# Patient Record
(demographics unavailable — no encounter records)

---

## 2024-11-17 NOTE — DISCUSSION/SUMMARY
[de-identified] : We discussed all these findings with the patient.  X-rays were again reviewed and discussed with the patient.  We discussed right knee DJD and lower back pain.  In regard to his left hip, he is doing very well.  Patient will continue exercises with appropriate modifications and will follow-up in 3 months.

## 2024-11-17 NOTE — DISCUSSION/SUMMARY
[de-identified] : We discussed all these findings with the patient.  X-rays were again reviewed and discussed with the patient.  We discussed right knee DJD and lower back pain.  In regard to his left hip, he is doing very well.  Patient will continue exercises with appropriate modifications and will follow-up in 3 months.

## 2024-11-17 NOTE — PHYSICAL EXAM
[de-identified] : Patient walks without a cane. Incision has healed very well.  He has only minimal limp that he attributes to right knee pain.  There is no pain in the left groin. There is no tenderness to palpation over the ischial tuberosity.  Range of motion of the hip is limited by stiffness.   [de-identified] : X-ray demonstrates no change in the position of components of the prosthesis. Bone consolidation around acetabular component again noted.  The previously prominent lytic lesions in the acetabular roof and the ischium appear to be filled with new bone.

## 2024-11-17 NOTE — PHYSICAL EXAM
[de-identified] : Patient walks without a cane. Incision has healed very well.  He has only minimal limp that he attributes to right knee pain.  There is no pain in the left groin. There is no tenderness to palpation over the ischial tuberosity.  Range of motion of the hip is limited by stiffness.   [de-identified] : X-ray demonstrates no change in the position of components of the prosthesis. Bone consolidation around acetabular component again noted.  The previously prominent lytic lesions in the acetabular roof and the ischium appear to be filled with new bone.

## 2024-11-17 NOTE — HISTORY OF PRESENT ILLNESS
[de-identified] : Mr. Tabares is a 66-year-old gentleman who is here today for a follow-up visit. 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 He is doing well and has been fully weightbearing.  He is only intermittent discomfort in the left hip.  He is very happy with his recovery process.  He reports again having pain in his right knee.

## 2024-11-17 NOTE — HISTORY OF PRESENT ILLNESS
[de-identified] : Mr. Tabares is a 66-year-old gentleman who is here today for a follow-up visit. 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 He is doing well and has been fully weightbearing.  He is only intermittent discomfort in the left hip.  He is very happy with his recovery process.  He reports again having pain in his right knee.

## 2025-02-13 NOTE — HISTORY OF PRESENT ILLNESS
[de-identified] : Mr. Tabares is a 66-year-old gentleman who is here today for a follow-up visit. 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 He is doing well and has only intermittent discomfort in the left hip.  He is very happy with his recovery process. He reports significant activity in the gym.  He does a lot of weight training which is his baseline. He reports again having pain in his right knee.

## 2025-02-13 NOTE — PHYSICAL EXAM
[de-identified] : Patient walks without a cane. Incision has healed very well.  He has only minimal limp that he attributes to right knee pain.  There is no pain in the left groin. There is no tenderness to palpation over the ischial tuberosity.  Range of motion of the hip is limited by stiffness. He has some pain in the groin with resisted abduction.  Test demonstrates good abduction strength.  He has some pain posteriorly with abduction external rotation of the hip. Bilateral knees demonstrate severe crepitus on patellar grind test.  There is no instability.  There is varus deformity.  There is global tenderness palpation.   [de-identified] : X-ray demonstrates no change in the position of components of the prosthesis. Bone consolidation around acetabular component again noted.  The previously prominent lytic lesions in the acetabular roof and the ischium appear to be filled with new bone.

## 2025-02-13 NOTE — HISTORY OF PRESENT ILLNESS
[de-identified] : Mr. Tabares is a 66-year-old gentleman who is here today for a follow-up visit. 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 He is doing well and has only intermittent discomfort in the left hip.  He is very happy with his recovery process. He reports significant activity in the gym.  He does a lot of weight training which is his baseline. He reports again having pain in his right knee.

## 2025-02-13 NOTE — PHYSICAL EXAM
[de-identified] : Patient walks without a cane. Incision has healed very well.  He has only minimal limp that he attributes to right knee pain.  There is no pain in the left groin. There is no tenderness to palpation over the ischial tuberosity.  Range of motion of the hip is limited by stiffness. He has some pain in the groin with resisted abduction.  Test demonstrates good abduction strength.  He has some pain posteriorly with abduction external rotation of the hip. Bilateral knees demonstrate severe crepitus on patellar grind test.  There is no instability.  There is varus deformity.  There is global tenderness palpation.   [de-identified] : X-ray demonstrates no change in the position of components of the prosthesis. Bone consolidation around acetabular component again noted.  The previously prominent lytic lesions in the acetabular roof and the ischium appear to be filled with new bone.

## 2025-02-13 NOTE — DISCUSSION/SUMMARY
[de-identified] : We discussed all these findings with the patient.  X-rays were again reviewed and discussed with the patient.  We discussed right knee DJD. At this point patient would like to continue conservative treatment.  I am ordering viscosupplementation.  Patient will follow-up when injections are received.

## 2025-02-13 NOTE — DISCUSSION/SUMMARY
[de-identified] : We discussed all these findings with the patient.  X-rays were again reviewed and discussed with the patient.  We discussed right knee DJD. At this point patient would like to continue conservative treatment.  I am ordering viscosupplementation.  Patient will follow-up when injections are received.

## 2025-02-18 NOTE — PROCEDURE
[de-identified] :  Discussed again with the patient the planned Gelsyn 3 injection. The risks, benefits, convalescence and alternatives were reviewed. The possible side effects discussed included but were not limited to pain, swelling, heat and redness. There symptoms are generally mild but if they are extensive then contact the office.   After discussion of the risks and benefits, the patient agreed to their Gelsyn 3 injection into the LEFT knee. Confirmed that the patient does not have a history of prior adverse reactions, active infections or relevant allergies. There was no effusion, erythema or warmth present, and the skin was clear. The skin was sterilized with alcohol. Topical anesthesia was achieved with ethyl chloride. Chlorhexidine was applied. A 22-gauge needle was inserted into the LEFT knee via a lateral approach without ultrasound guidance. The injection was completed without complication and a bandage was applied.   The patient tolerated the procedure well and was instructed to avoid strenuous activities for the next 24-48 hours and to use ice, NSAIDs, or Tylenol for pain as needed. The patient will call immediately with any signs of infection or allergic reaction. Specialty Pharmacy Filled : LOT: [id number] EXP: 2027-04-30

## 2025-03-04 NOTE — PROCEDURE
[de-identified] :  Discussed again with the patient the planned Gelsyn 3 injection. The risks, benefits, convalescence and alternatives were reviewed. The possible side effects discussed included but were not limited to pain, swelling, heat and redness. There symptoms are generally mild but if they are extensive then contact the office.   After discussion of the risks and benefits, the patient agreed to their Gelsyn 3 injection into the right knee. Confirmed that the patient does not have a history of prior adverse reactions, active infections or relevant allergies. There was no effusion, erythema or warmth present, and the skin was clear. The skin was sterilized with alcohol. Topical anesthesia was achieved with ethyl chloride. Chlorhexidine was applied. A 22-gauge needle was inserted into the right knee via a lateral approach without ultrasound guidance. The injection was completed without complication and a bandage was applied.   The patient tolerated the procedure well and was instructed to avoid strenuous activities for the next 24-48 hours and to use ice, NSAIDs, or Tylenol for pain as needed. The patient will call immediately with any signs of infection or allergic reaction. Specialty Pharmacy Filled : LOT: [id number] EXP: 2027-04-30

## 2025-03-04 NOTE — PROCEDURE
[de-identified] :  Discussed again with the patient the planned Gelsyn 3 injection. The risks, benefits, convalescence and alternatives were reviewed. The possible side effects discussed included but were not limited to pain, swelling, heat and redness. There symptoms are generally mild but if they are extensive then contact the office.   After discussion of the risks and benefits, the patient agreed to their Gelsyn 3 injection into the Right knee. Confirmed that the patient does not have a history of prior adverse reactions, active infections or relevant allergies. There was no effusion, erythema or warmth present, and the skin was clear. The skin was sterilized with alcohol. Topical anesthesia was achieved with ethyl chloride. Chlorhexidine was applied. A 22-gauge needle was inserted into the Right knee via a lateral approach without ultrasound guidance. The injection was completed without complication and a bandage was applied.   The patient tolerated the procedure well and was instructed to avoid strenuous activities for the next 24-48 hours and to use ice, NSAIDs, or Tylenol for pain as needed. The patient will call immediately with any signs of infection or allergic reaction. Specialty Pharmacy Filled : LOT: [id number] EXP: 2027-04-30

## 2025-03-04 NOTE — PROCEDURE
[de-identified] :  Discussed again with the patient the planned Gelsyn 3 injection. The risks, benefits, convalescence and alternatives were reviewed. The possible side effects discussed included but were not limited to pain, swelling, heat and redness. There symptoms are generally mild but if they are extensive then contact the office.   After discussion of the risks and benefits, the patient agreed to their Gelsyn 3 injection into the right knee. Confirmed that the patient does not have a history of prior adverse reactions, active infections or relevant allergies. There was no effusion, erythema or warmth present, and the skin was clear. The skin was sterilized with alcohol. Topical anesthesia was achieved with ethyl chloride. Chlorhexidine was applied. A 22-gauge needle was inserted into the right knee via a lateral approach without ultrasound guidance. The injection was completed without complication and a bandage was applied.   The patient tolerated the procedure well and was instructed to avoid strenuous activities for the next 24-48 hours and to use ice, NSAIDs, or Tylenol for pain as needed. The patient will call immediately with any signs of infection or allergic reaction. Specialty Pharmacy Filled : LOT: [id number] EXP: 2027-04-30

## 2025-07-14 NOTE — DATA REVIEWED
[FreeTextEntry1] : Venous duplex scan performed today revealed no evidence of deep venous thrombosis or superficial thrombophlebitis along the lower extremities bilaterally.  Superficial venous reflux was noted along the great saphenous veins bilaterally with a venous reflux time of 6.7 seconds on the right and 6.5 seconds on the left.  Great saphenous veins diameter was 7.6 mm on the right and 6.9 mm on the left.  Incompetent varicosities were also noted in the calf regions bilaterally.

## 2025-07-14 NOTE — REVIEW OF SYSTEMS
[Arthralgias] : arthralgias [Joint Swelling] : joint swelling [Joint Stiffness] : joint stiffness [Limb Pain] : limb pain [Limb Swelling] : limb swelling [Skin Lesions] : no skin lesions [Skin Wound] : no skin wound [Itching] : itching [Change In A Mole] : no change in a mole [Dry Skin] : dry skin [An Unusual Growth] : no unusual growth on the skin [Negative] : Heme/Lymph

## 2025-07-14 NOTE — ASSESSMENT
[FreeTextEntry1] : The patient has venous insufficiency with bilateral lower extremity varicosities which are lower extremity venous duplex scan performed today revealed no evidence of deep venous thrombosis or superficial thrombophlebitis.  Superficial venous reflux was noted along the great veins bilaterally.  Patient does not utilize compression stockings for his venous insufficiency and varicosities.  Treatment options were discussed with the patient in detail including utilization of compression stockings and venous ablation therapy.  Recommended that the patient utilize compression stockings at this time.  Prescription for compression stockings generating 20 to 30 mmHg compression was applied to the patient.  Patient will be reevaluated in approximately 3 months.  If his symptoms are not controlled with conservative management then endovenous radiofrequency ablation of the great saphenous veins bilaterally would be medically indicated.    32 minutes were spent with the patient discussing symptoms, physical exam findings, vascular lab testing results, underlying condition and treatment options.

## 2025-07-14 NOTE — PHYSICAL EXAM
[2+] : left 2+ [Ankle Swelling (On Exam)] : present [Ankle Swelling Bilaterally] : bilaterally  [Ankle Swelling On The Right] : mild [Varicose Veins Of Lower Extremities] : bilaterally [] : bilaterally [Ankle Swelling On The Left] : moderate [FreeTextEntry1] : CEAP:  Right C4a  Left C4a

## 2025-07-14 NOTE — HISTORY OF PRESENT ILLNESS
[FreeTextEntry1] : 66-year-old male with a longstanding history of venous insufficiency and bilateral lower extremity varicose veins who states that he had been seen by his dermatologist recently who recommended a vascular surgical evaluation for venous stasis skin changes.  The patient denies pain overlying the varicosities.  He does have intermittent lower extremity swelling when standing for prolonged periods of time and occasional tiredness and heaviness along the extremities.  He states that left lower extremity swelling has increased after a redo left total hip replacement approximately 1 year ago.  Patient does not utilize compression stockings.